# Patient Record
Sex: MALE | Race: WHITE | NOT HISPANIC OR LATINO | URBAN - METROPOLITAN AREA
[De-identification: names, ages, dates, MRNs, and addresses within clinical notes are randomized per-mention and may not be internally consistent; named-entity substitution may affect disease eponyms.]

---

## 2021-01-12 ENCOUNTER — OFFICE (OUTPATIENT)
Dept: URBAN - METROPOLITAN AREA CLINIC 45 | Facility: CLINIC | Age: 81
End: 2021-01-12

## 2021-01-12 VITALS — HEIGHT: 69 IN | WEIGHT: 185 LBS

## 2021-01-12 DIAGNOSIS — D63.8 ANEMIA, CHRONIC ILLNESS: ICD-10-CM

## 2021-01-12 DIAGNOSIS — F03.90 DEMENTIA: ICD-10-CM

## 2021-01-12 DIAGNOSIS — I25.10 CORONARY ARTERY DISEASE: ICD-10-CM

## 2021-01-12 DIAGNOSIS — R10.30: ICD-10-CM

## 2021-01-12 DIAGNOSIS — I63.9: ICD-10-CM

## 2021-01-12 DIAGNOSIS — D50.9 IRON DEFICIENCY ANEMIA: ICD-10-CM

## 2021-01-12 DIAGNOSIS — I48.91 ATRIAL FIBRILLATION: ICD-10-CM

## 2021-01-12 PROCEDURE — G0406 INPT/TELE FOLLOW UP 15: HCPCS | Performed by: INTERNAL MEDICINE

## 2021-01-12 PROCEDURE — 99214 OFFICE O/P EST MOD 30 MIN: CPT | Performed by: INTERNAL MEDICINE

## 2021-01-12 RX ORDER — CAYENNE 450 MG
CAPSULE ORAL
Qty: 60 | Status: COMPLETED
End: 2021-01-12

## 2021-01-12 NOTE — SERVICEHPINOTES
Patient presents for telemedicine evaluation of recent complains of abdominal discomfort/cramping or pain.  Unfortunately, patient cannot provide any history since he is nonverbal following prior CVA. His wife provides most of the history, and reports him complaining of abdominal pain and discomfort, intermittent constipation.  She denies noticing nausea, vomiting, fever or chills There has been a previous EGD performed by me in May 2019 to evaluate anemia while he was hospitalized.  The exam did not demonstrate significant abnormalities or suggest specific stigmata of bleeding. Colonoscopy was deferred given patient's poor functional status.  Patient has been maintained on oral iron supplementation. The patient has no family history of colon cancer or other significant GI diseases. Patient's wife states patient does not have rectal bleeding, melena, and significant change in weight. Denies shortness of breath and chest pain.

## 2021-02-16 ENCOUNTER — OFFICE (OUTPATIENT)
Dept: URBAN - METROPOLITAN AREA CLINIC 45 | Facility: CLINIC | Age: 81
End: 2021-02-16

## 2021-02-16 VITALS — HEIGHT: 69 IN

## 2021-02-16 DIAGNOSIS — R10.30: ICD-10-CM

## 2021-02-16 DIAGNOSIS — I63.9: ICD-10-CM

## 2021-02-16 DIAGNOSIS — I48.91 ATRIAL FIBRILLATION: ICD-10-CM

## 2021-02-16 DIAGNOSIS — D63.8 ANEMIA, CHRONIC ILLNESS: ICD-10-CM

## 2021-02-16 PROCEDURE — G0406 INPT/TELE FOLLOW UP 15: HCPCS | Performed by: INTERNAL MEDICINE

## 2021-02-16 PROCEDURE — 99214 OFFICE O/P EST MOD 30 MIN: CPT | Performed by: INTERNAL MEDICINE

## 2021-02-16 RX ORDER — DICYCLOMINE HYDROCHLORIDE 20 MG/1
TABLET ORAL
Qty: 60 | Status: ACTIVE

## 2021-02-16 NOTE — SERVICEHPINOTES
Patient presents for telemedicine f/u of abdominal discomfort/cramping or pain. Patient's wife provides interpretation since he is practically nonverbal and cannot provide any history. She states he continues to complain of abdominal pain and discomfort,, which seems tolerable. She denies noticing diarrhea/constipation, vomiting or signs of GI blood loss, or significant change in weight. Denies shortness of breath and chest pain.

## 2021-10-18 ENCOUNTER — OFFICE (OUTPATIENT)
Dept: URBAN - METROPOLITAN AREA CLINIC 45 | Facility: CLINIC | Age: 81
End: 2021-10-18

## 2021-10-18 VITALS — HEIGHT: 69 IN

## 2021-10-18 DIAGNOSIS — I48.91 ATRIAL FIBRILLATION: ICD-10-CM

## 2021-10-18 DIAGNOSIS — B18.1: ICD-10-CM

## 2021-10-18 DIAGNOSIS — F03.90 DEMENTIA: ICD-10-CM

## 2021-10-18 DIAGNOSIS — I63.9: ICD-10-CM

## 2021-10-18 DIAGNOSIS — I25.10 CORONARY ARTERY DISEASE: ICD-10-CM

## 2021-10-18 PROCEDURE — G0406 INPT/TELE FOLLOW UP 15: HCPCS | Performed by: INTERNAL MEDICINE

## 2021-10-18 PROCEDURE — 99213 OFFICE O/P EST LOW 20 MIN: CPT | Performed by: INTERNAL MEDICINE

## 2021-10-18 NOTE — SERVICEHPINOTES
Patient returns for telemedicine visit to discuss recent discovery Hepatitis B surface antigen on routine blood work. Patient does not have known history of hepatitis or jaundice. Patient's wife provides interpretation since he is practically nonverbal after CVA and has underlying dementia. She states he is fairly asymptomatic otherwise from GI standpoint. She denies noticing diarrhea/constipation, vomiting or signs of GI blood loss, or significant change in weight. Denies shortness of breath and chest pain.

## 2021-10-28 LAB
HEPATIC FUNCTION PANEL: ALBUMIN/GLOBULIN RATIO: 1.2 (CALC) (ref 1–2.5)
HEPATIC FUNCTION PANEL: ALBUMIN: 4 G/DL (ref 3.6–5.1)
HEPATIC FUNCTION PANEL: ALKALINE PHOSPHATASE: 82 U/L (ref 35–144)
HEPATIC FUNCTION PANEL: ALT: 11 U/L (ref 9–46)
HEPATIC FUNCTION PANEL: AST: 17 U/L (ref 10–35)
HEPATIC FUNCTION PANEL: BILIRUBIN, DIRECT: 0.3 MG/DL — HIGH (ref ?–0.2)
HEPATIC FUNCTION PANEL: BILIRUBIN, INDIRECT: 0.8 MG/DL (CALC) (ref 0.2–1.2)
HEPATIC FUNCTION PANEL: BILIRUBIN, TOTAL: 1.1 MG/DL (ref 0.2–1.2)
HEPATIC FUNCTION PANEL: GLOBULIN: 3.3 G/DL (CALC) (ref 1.9–3.7)
HEPATIC FUNCTION PANEL: PROTEIN, TOTAL: 7.3 G/DL (ref 6.1–8.1)
HEPATITIS B SURFACE ANTIBODY QL: NORMAL
HEPATITIS B SURFACE ANTIGEN W/REFL CONFIRM: CONFIRMATION: REACTIVE
HEPATITIS B SURFACE ANTIGEN W/REFL CONFIRM: HEPATITIS B SURFACE ANTIGEN: REACTIVE
HEPATITIS B VIRUS DNA, QN, REAL TIME PCR: HEPATITIS B VIRUS DNA: ABNORMAL IU/ML
HEPATITIS B VIRUS DNA, QN, REAL TIME PCR: HEPATITIS B VIRUS DNA: ABNORMAL LOG IU/ML
HEPATITIS BE ANTIBODY: REACTIVE

## 2022-03-02 ENCOUNTER — OFFICE (OUTPATIENT)
Dept: URBAN - METROPOLITAN AREA CLINIC 45 | Facility: CLINIC | Age: 82
End: 2022-03-02

## 2022-03-02 VITALS — HEIGHT: 69 IN | SYSTOLIC BLOOD PRESSURE: 95 MMHG | DIASTOLIC BLOOD PRESSURE: 63 MMHG

## 2022-03-02 DIAGNOSIS — F06.8: ICD-10-CM

## 2022-03-02 DIAGNOSIS — D63.8 ANEMIA, CHRONIC ILLNESS: ICD-10-CM

## 2022-03-02 DIAGNOSIS — I63.9: ICD-10-CM

## 2022-03-02 DIAGNOSIS — R19.7 DIARRHEA (UNSPECIFIED): ICD-10-CM

## 2022-03-02 DIAGNOSIS — I25.10 CORONARY ARTERY DISEASE: ICD-10-CM

## 2022-03-02 PROCEDURE — G0406 INPT/TELE FOLLOW UP 15: HCPCS | Performed by: INTERNAL MEDICINE

## 2022-03-02 PROCEDURE — 99214 OFFICE O/P EST MOD 30 MIN: CPT | Performed by: INTERNAL MEDICINE

## 2022-03-02 NOTE — SERVICEHPINOTES
Patient returns for telemedicine evaluation of acute onset of diarrhea with fecal incontinence and generalized weakness.  History was taken from patient's wife since patient has been aphasic following cerebrovascular accident happened several years ago. Patient has not had anymore diarrhea during the day, denies abdominal pain or nausea.  He appears to have decreased appetite, and has been refusing some of his medications. There has been a previous unremarkable EGD performed by me in May 2019 to evaluate anemia while he was hospitalized. Colonoscopy was deferred given patient's poor functional status. Patient's wife states patient does not have rectal bleeding, melena, and significant change in weight. Denies shortness of breath and chest pain.

## 2022-05-16 ENCOUNTER — HOSPITAL ENCOUNTER (INPATIENT)
Dept: HOSPITAL 12 - ER | Age: 82
LOS: 7 days | Discharge: HOME HEALTH | DRG: 280 | End: 2022-05-23
Attending: INTERNAL MEDICINE
Payer: MEDICARE

## 2022-05-16 VITALS — BODY MASS INDEX: 23.59 KG/M2 | HEIGHT: 73 IN | WEIGHT: 178 LBS

## 2022-05-16 DIAGNOSIS — G93.40: ICD-10-CM

## 2022-05-16 DIAGNOSIS — E05.90: ICD-10-CM

## 2022-05-16 DIAGNOSIS — I35.0: ICD-10-CM

## 2022-05-16 DIAGNOSIS — I48.20: ICD-10-CM

## 2022-05-16 DIAGNOSIS — Z85.028: ICD-10-CM

## 2022-05-16 DIAGNOSIS — I69.320: ICD-10-CM

## 2022-05-16 DIAGNOSIS — Z90.3: ICD-10-CM

## 2022-05-16 DIAGNOSIS — T46.5X5A: ICD-10-CM

## 2022-05-16 DIAGNOSIS — R32: ICD-10-CM

## 2022-05-16 DIAGNOSIS — E87.5: ICD-10-CM

## 2022-05-16 DIAGNOSIS — L03.311: ICD-10-CM

## 2022-05-16 DIAGNOSIS — G40.909: ICD-10-CM

## 2022-05-16 DIAGNOSIS — Z74.09: ICD-10-CM

## 2022-05-16 DIAGNOSIS — K80.20: ICD-10-CM

## 2022-05-16 DIAGNOSIS — Z87.891: ICD-10-CM

## 2022-05-16 DIAGNOSIS — I95.2: Primary | ICD-10-CM

## 2022-05-16 DIAGNOSIS — Z90.49: ICD-10-CM

## 2022-05-16 DIAGNOSIS — I69.351: ICD-10-CM

## 2022-05-16 DIAGNOSIS — N20.0: ICD-10-CM
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
## 2022-05-16 DIAGNOSIS — T50.2X5A: ICD-10-CM

## 2022-05-16 DIAGNOSIS — Z93.2: ICD-10-CM

## 2022-05-16 DIAGNOSIS — Z79.01: ICD-10-CM

## 2022-05-16 DIAGNOSIS — Z20.822: ICD-10-CM

## 2022-05-16 DIAGNOSIS — F01.50: ICD-10-CM

## 2022-05-16 DIAGNOSIS — Z66: ICD-10-CM

## 2022-05-16 DIAGNOSIS — N17.0: ICD-10-CM

## 2022-05-16 DIAGNOSIS — I49.5: ICD-10-CM

## 2022-05-16 DIAGNOSIS — I21.A1: ICD-10-CM

## 2022-05-16 DIAGNOSIS — R73.9: ICD-10-CM

## 2022-05-16 DIAGNOSIS — D68.59: ICD-10-CM

## 2022-05-16 DIAGNOSIS — Z85.038: ICD-10-CM

## 2022-05-16 DIAGNOSIS — I13.0: ICD-10-CM

## 2022-05-16 DIAGNOSIS — N18.9: ICD-10-CM

## 2022-05-16 DIAGNOSIS — K57.30: ICD-10-CM

## 2022-05-16 DIAGNOSIS — I77.810: ICD-10-CM

## 2022-05-16 DIAGNOSIS — Z87.01: ICD-10-CM

## 2022-05-16 DIAGNOSIS — I50.31: ICD-10-CM

## 2022-05-16 LAB
ALP SERPL-CCNC: 218 U/L (ref 50–136)
ALT SERPL W/O P-5'-P-CCNC: 37 U/L (ref 16–63)
APPEARANCE UR: CLEAR
AST SERPL-CCNC: 15 U/L (ref 15–37)
BILIRUB DIRECT SERPL-MCNC: 0.3 MG/DL (ref 0–0.2)
BILIRUB SERPL-MCNC: 0.7 MG/DL (ref 0.2–1)
BILIRUB UR QL STRIP: (no result)
BUN SERPL-MCNC: 70 MG/DL (ref 7–18)
CHLORIDE SERPL-SCNC: 108 MMOL/L (ref 98–107)
CO2 SERPL-SCNC: 14 MMOL/L (ref 21–32)
COLOR UR: YELLOW
CREAT SERPL-MCNC: 8.1 MG/DL (ref 0.6–1.3)
DEPRECATED SQUAMOUS URNS QL MICRO: (no result) /HPF
GLUCOSE SERPL-MCNC: 110 MG/DL (ref 74–106)
GLUCOSE UR STRIP-MCNC: NEGATIVE MG/DL
HCT VFR BLD AUTO: 38.1 % (ref 36.7–47.1)
HGB UR QL STRIP: (no result)
KETONES UR STRIP-MCNC: (no result) MG/DL
LEUKOCYTE ESTERASE UR QL STRIP: NEGATIVE
LIPASE SERPL-CCNC: 511 U/L (ref 73–393)
MCH RBC QN AUTO: 29.5 UUG (ref 23.8–33.4)
MCV RBC AUTO: 90.5 FL (ref 73–96.2)
NITRITE UR QL STRIP: NEGATIVE
PH UR STRIP: 5 [PH] (ref 5–8)
PLATELET # BLD AUTO: 153 K/UL (ref 152–348)
POTASSIUM SERPL-SCNC: 5.3 MMOL/L (ref 3.5–5.1)
RBC #/AREA URNS HPF: (no result) /HPF (ref 0–3)
SP GR UR STRIP: >=1.03 (ref 1–1.03)
UROBILINOGEN UR STRIP-MCNC: 0.2 E.U./DL
WBC #/AREA URNS HPF: (no result) /HPF
WBC #/AREA URNS HPF: (no result) /HPF (ref 0–3)
WS STN SPEC: 6.9 G/DL (ref 6.4–8.2)

## 2022-05-16 PROCEDURE — A6209 FOAM DRSG <=16 SQ IN W/O BDR: HCPCS

## 2022-05-16 PROCEDURE — G0378 HOSPITAL OBSERVATION PER HR: HCPCS

## 2022-05-16 PROCEDURE — C1758 CATHETER, URETERAL: HCPCS

## 2022-05-16 PROCEDURE — A4663 DIALYSIS BLOOD PRESSURE CUFF: HCPCS

## 2022-05-16 NOTE — NUR
pt's family state they are leaving phone number for contact is [place] [phone] they state they do not want a colostomy bag placed as there is a rash at 
stoma site and do not want a urinary f/c placed.

## 2022-05-17 VITALS — SYSTOLIC BLOOD PRESSURE: 108 MMHG | DIASTOLIC BLOOD PRESSURE: 38 MMHG

## 2022-05-17 VITALS — SYSTOLIC BLOOD PRESSURE: 103 MMHG | DIASTOLIC BLOOD PRESSURE: 53 MMHG

## 2022-05-17 VITALS — SYSTOLIC BLOOD PRESSURE: 96 MMHG | DIASTOLIC BLOOD PRESSURE: 57 MMHG

## 2022-05-17 VITALS — DIASTOLIC BLOOD PRESSURE: 54 MMHG | SYSTOLIC BLOOD PRESSURE: 92 MMHG

## 2022-05-17 VITALS — SYSTOLIC BLOOD PRESSURE: 101 MMHG | DIASTOLIC BLOOD PRESSURE: 71 MMHG

## 2022-05-17 VITALS — DIASTOLIC BLOOD PRESSURE: 94 MMHG | SYSTOLIC BLOOD PRESSURE: 106 MMHG

## 2022-05-17 LAB
APPEARANCE UR: CLEAR
BILIRUB UR QL STRIP: NEGATIVE
BUN SERPL-MCNC: 66 MG/DL (ref 7–18)
BUN SERPL-MCNC: 72 MG/DL (ref 7–18)
CHLORIDE SERPL-SCNC: 111 MMOL/L (ref 98–107)
CHLORIDE SERPL-SCNC: 111 MMOL/L (ref 98–107)
CHOLEST SERPL-MCNC: 85 MG/DL (ref ?–200)
CO2 SERPL-SCNC: 14 MMOL/L (ref 21–32)
CO2 SERPL-SCNC: 16 MMOL/L (ref 21–32)
COLOR UR: YELLOW
CREAT SERPL-MCNC: 6.8 MG/DL (ref 0.6–1.3)
CREAT SERPL-MCNC: 7.3 MG/DL (ref 0.6–1.3)
DEPRECATED SQUAMOUS URNS QL MICRO: (no result) /HPF
GLUCOSE SERPL-MCNC: 142 MG/DL (ref 74–106)
GLUCOSE SERPL-MCNC: 90 MG/DL (ref 74–106)
GLUCOSE UR STRIP-MCNC: NEGATIVE MG/DL
HCT VFR BLD AUTO: 39 % (ref 36.7–47.1)
HDLC SERPL-MCNC: 54 MG/DL (ref 40–60)
HGB UR QL STRIP: (no result)
KETONES UR STRIP-MCNC: NEGATIVE MG/DL
LEUKOCYTE ESTERASE UR QL STRIP: NEGATIVE
MAGNESIUM SERPL-MCNC: 2.4 MG/DL (ref 1.8–2.4)
MCH RBC QN AUTO: 29.7 UUG (ref 23.8–33.4)
MCV RBC AUTO: 89.5 FL (ref 73–96.2)
NITRITE UR QL STRIP: NEGATIVE
PH UR STRIP: 5 [PH] (ref 5–8)
PHOSPHATE SERPL-MCNC: 5.6 MG/DL (ref 2.5–4.9)
PLATELET # BLD AUTO: 148 K/UL (ref 152–348)
POTASSIUM SERPL-SCNC: 4.9 MMOL/L (ref 3.5–5.1)
POTASSIUM SERPL-SCNC: 5.3 MMOL/L (ref 3.5–5.1)
RBC #/AREA URNS HPF: (no result) /HPF (ref 0–3)
SP GR UR STRIP: 1.02 (ref 1–1.03)
TRIGL SERPL-MCNC: 16 MG/DL (ref 30–150)
TSH SERPL DL<=0.005 MIU/L-ACNC: 0.01 MIU/ML (ref 0.36–3.74)
UROBILINOGEN UR STRIP-MCNC: 0.2 E.U./DL
WBC #/AREA URNS HPF: (no result) /HPF
WBC #/AREA URNS HPF: (no result) /HPF (ref 0–3)

## 2022-05-17 RX ADMIN — SODIUM CHLORIDE PRN MLS/HR: 0.45 INJECTION, SOLUTION INTRAVENOUS at 03:07

## 2022-05-17 RX ADMIN — APIXABAN SCH MG: 2.5 TABLET, FILM COATED ORAL at 08:23

## 2022-05-17 RX ADMIN — DEXTROSE AND SODIUM CHLORIDE PRN MLS/HR: 5; .9 INJECTION, SOLUTION INTRAVENOUS at 21:43

## 2022-05-17 RX ADMIN — SODIUM CHLORIDE PRN MLS/HR: 0.45 INJECTION, SOLUTION INTRAVENOUS at 16:12

## 2022-05-17 RX ADMIN — ATORVASTATIN CALCIUM SCH MG: 40 TABLET, FILM COATED ORAL at 20:51

## 2022-05-17 RX ADMIN — SODIUM CHLORIDE PRN MLS/HR: 0.45 INJECTION, SOLUTION INTRAVENOUS at 08:23

## 2022-05-17 RX ADMIN — PANTOPRAZOLE SODIUM SCH MG: 40 TABLET, DELAYED RELEASE ORAL at 06:33

## 2022-05-17 RX ADMIN — APIXABAN SCH MG: 2.5 TABLET, FILM COATED ORAL at 20:52

## 2022-05-17 NOTE — NUR
Received patient in bed, awake but aphasic, patient on tele monitor still smitha on 42 to 46, 
Patient appears comfortable, no sob no chest pain noted, colostomy intact drainining with 
brownish greenish soft feces, incontinent of bladder, kept clean and dry, no s/s of pain at 
this time cont to monitor.

## 2022-05-17 NOTE — NUR
pt transferred to room 301b Anahy LAST and Yelena LAST in room to receive the pt.  
pt transferred with all belongings.

## 2022-05-17 NOTE — NUR
Pt in no acute distress. Pt iv intact. Pt on controlled afib with BBB. Pt turned and 
repositioned.Safety and comfort provided. Will con tinue to monitor.

## 2022-05-17 NOTE — NUR
PATIENT TELE MONITOR A FIB LOW 32 UP AND DOWN, NOTIFY DR. SANCHEZ ABOUT THE MULTIPLE EPISODE OF 
BRADYCARDIA.

## 2022-05-17 NOTE — NUR
Received patient in [facility] awake and alert to self. Nonverbal with history of CVA. 99% on 
room air. Admitting diagnosis is ELADIA. Under the care of Sadia. Patient is 
incontinent.  History of gastrostomy resection with colostomy. Stoma is red. No colostomy 
bag present as per family request.  skin rash around stoma.  Stool is small amount and 
yellow.  LFA 22 gauge. On tele.

## 2022-05-18 VITALS — DIASTOLIC BLOOD PRESSURE: 55 MMHG | SYSTOLIC BLOOD PRESSURE: 89 MMHG

## 2022-05-18 VITALS — DIASTOLIC BLOOD PRESSURE: 66 MMHG | SYSTOLIC BLOOD PRESSURE: 107 MMHG

## 2022-05-18 VITALS — SYSTOLIC BLOOD PRESSURE: 108 MMHG | DIASTOLIC BLOOD PRESSURE: 66 MMHG

## 2022-05-18 VITALS — DIASTOLIC BLOOD PRESSURE: 56 MMHG | SYSTOLIC BLOOD PRESSURE: 102 MMHG

## 2022-05-18 VITALS — SYSTOLIC BLOOD PRESSURE: 93 MMHG | DIASTOLIC BLOOD PRESSURE: 63 MMHG

## 2022-05-18 LAB
ALP SERPL-CCNC: 176 U/L (ref 50–136)
ALT SERPL W/O P-5'-P-CCNC: 31 U/L (ref 16–63)
AST SERPL-CCNC: 10 U/L (ref 15–37)
BILIRUB SERPL-MCNC: 0.9 MG/DL (ref 0.2–1)
BUN SERPL-MCNC: 59 MG/DL (ref 7–18)
CHLORIDE SERPL-SCNC: 112 MMOL/L (ref 98–107)
CK SERPL-CCNC: 40 U/L (ref 39–308)
CO2 SERPL-SCNC: 18 MMOL/L (ref 21–32)
CREAT SERPL-MCNC: 4.4 MG/DL (ref 0.6–1.3)
CREAT UR-MCNC: 208.6 MG/DL (ref 30–125)
GLUCOSE SERPL-MCNC: 92 MG/DL (ref 74–106)
HCT VFR BLD AUTO: 36.6 % (ref 36.7–47.1)
LIPASE SERPL-CCNC: 564 U/L (ref 73–393)
MAGNESIUM SERPL-MCNC: 2 MG/DL (ref 1.8–2.4)
MCH RBC QN AUTO: 29.6 UUG (ref 23.8–33.4)
MCV RBC AUTO: 88.8 FL (ref 73–96.2)
PHOSPHATE SERPL-MCNC: 4 MG/DL (ref 2.5–4.9)
PLATELET # BLD AUTO: 127 K/UL (ref 152–348)
POTASSIUM SERPL-SCNC: 5.1 MMOL/L (ref 3.5–5.1)
PROT UR-MCNC: 121.9 MG/DL
TSH SERPL DL<=0.005 MIU/L-ACNC: 0.01 MIU/ML (ref 0.36–3.74)
WS STN SPEC: 6.6 G/DL (ref 6.4–8.2)

## 2022-05-18 RX ADMIN — DEXTROSE AND SODIUM CHLORIDE PRN MLS/HR: 5; .9 INJECTION, SOLUTION INTRAVENOUS at 10:12

## 2022-05-18 RX ADMIN — MIDODRINE HYDROCHLORIDE SCH MG: 5 TABLET ORAL at 16:58

## 2022-05-18 RX ADMIN — DEXTROSE AND SODIUM CHLORIDE PRN MLS/HR: 5; .9 INJECTION, SOLUTION INTRAVENOUS at 23:10

## 2022-05-18 RX ADMIN — APIXABAN SCH MG: 2.5 TABLET, FILM COATED ORAL at 09:00

## 2022-05-18 RX ADMIN — APIXABAN SCH MG: 2.5 TABLET, FILM COATED ORAL at 20:40

## 2022-05-18 RX ADMIN — MIDODRINE HYDROCHLORIDE SCH MG: 5 TABLET ORAL at 21:05

## 2022-05-18 RX ADMIN — ATORVASTATIN CALCIUM SCH MG: 40 TABLET, FILM COATED ORAL at 20:40

## 2022-05-18 RX ADMIN — Medication SCH ML: at 12:37

## 2022-05-18 RX ADMIN — PANTOPRAZOLE SODIUM SCH MG: 40 TABLET, DELAYED RELEASE ORAL at 06:18

## 2022-05-18 NOTE — NUR
Patient has episode of v tach 4 sec, checked patient noted with restlessness, 
reposition,changed patient wet diaper,  kept patient clean and dry, kept hob elevated, put 
back her oxygen, patient went back to sinus rhythm sinus smitha , no s/s of distress, cont to 
monitor.

## 2022-05-18 NOTE — NUR
AWAKE ALERT AND VERBALLY RESPONSIVE BUT CONFUSED X3, BARELY FOLLOW COMMANDS. NO SS OF PAIN 
OR SOB WITH O2 AT 3L NC SATURATING 93-95 %. CONTINUE TELE MONITORING CONTROLLED A-FIB ON 
MONITOR

## 2022-05-18 NOTE — NUR
Received patient lying in bed. AAOx1 only, mainly confused. In no apparent distress. On O2 
at 3LPM via NC in place. O2 sat at 92%. No signs or symptoms of pain or SOB. A fib on tele 
with HR of 47/min. Colostomy bag to RLQ intact. 50cc liquid stool drained. Needs assessed 
and attended to. Safety measure initiated and call light within reached.

## 2022-05-19 VITALS — SYSTOLIC BLOOD PRESSURE: 115 MMHG | DIASTOLIC BLOOD PRESSURE: 73 MMHG

## 2022-05-19 VITALS — SYSTOLIC BLOOD PRESSURE: 88 MMHG | DIASTOLIC BLOOD PRESSURE: 48 MMHG

## 2022-05-19 VITALS — DIASTOLIC BLOOD PRESSURE: 53 MMHG | SYSTOLIC BLOOD PRESSURE: 93 MMHG

## 2022-05-19 VITALS — SYSTOLIC BLOOD PRESSURE: 147 MMHG | DIASTOLIC BLOOD PRESSURE: 95 MMHG

## 2022-05-19 VITALS — SYSTOLIC BLOOD PRESSURE: 98 MMHG | DIASTOLIC BLOOD PRESSURE: 52 MMHG

## 2022-05-19 LAB
ALBUMIN SERPL ELPH-MCNC: 2.9 G/DL (ref 2.9–4.4)
ALBUMIN/GLOB SERPL: 0.9 {RATIO} (ref 0.7–1.7)
ALPHA1 GLOB SERPL ELPH-MCNC: 0.2 G/DL (ref 0–0.4)
ALPHA2 GLOB SERPL ELPH-MCNC: 0.6 G/DL (ref 0.4–1)
B-GLOBULIN SERPL ELPH-MCNC: 1 G/DL (ref 0.7–1.3)
BUN SERPL-MCNC: 48 MG/DL (ref 7–18)
CHLORIDE SERPL-SCNC: 114 MMOL/L (ref 98–107)
CO2 SERPL-SCNC: 18 MMOL/L (ref 21–32)
CREAT SERPL-MCNC: 2.5 MG/DL (ref 0.6–1.3)
GAMMA GLOB SERPL ELPH-MCNC: 1.4 G/DL (ref 0.4–1.8)
GLOBULIN SER CALC-MCNC: 3.2 G/DL (ref 2.2–3.9)
GLUCOSE SERPL-MCNC: 89 MG/DL (ref 74–106)
HCT VFR BLD AUTO: 35.8 % (ref 36.7–47.1)
MAGNESIUM SERPL-MCNC: 1.6 MG/DL (ref 1.8–2.4)
MCH RBC QN AUTO: 29.5 UUG (ref 23.8–33.4)
MCV RBC AUTO: 88.9 FL (ref 73–96.2)
PHOSPHATE SERPL-MCNC: 2.6 MG/DL (ref 2.5–4.9)
PLATELET # BLD AUTO: 121 K/UL (ref 152–348)
POTASSIUM SERPL-SCNC: 5.1 MMOL/L (ref 3.5–5.1)

## 2022-05-19 RX ADMIN — Medication PRN MLS/HR: at 12:09

## 2022-05-19 RX ADMIN — ACETAMINOPHEN PRN MG: 325 TABLET ORAL at 17:59

## 2022-05-19 RX ADMIN — APIXABAN SCH MG: 2.5 TABLET, FILM COATED ORAL at 08:08

## 2022-05-19 RX ADMIN — MIDODRINE HYDROCHLORIDE SCH MG: 5 TABLET ORAL at 21:11

## 2022-05-19 RX ADMIN — MIDODRINE HYDROCHLORIDE SCH MG: 5 TABLET ORAL at 12:56

## 2022-05-19 RX ADMIN — MIDODRINE HYDROCHLORIDE SCH MG: 5 TABLET ORAL at 06:12

## 2022-05-19 RX ADMIN — ATORVASTATIN CALCIUM SCH MG: 40 TABLET, FILM COATED ORAL at 20:47

## 2022-05-19 RX ADMIN — DEXTROSE AND SODIUM CHLORIDE PRN MLS/HR: 5; .9 INJECTION, SOLUTION INTRAVENOUS at 10:13

## 2022-05-19 RX ADMIN — PANTOPRAZOLE SODIUM SCH MG: 40 TABLET, DELAYED RELEASE ORAL at 06:12

## 2022-05-19 RX ADMIN — Medication SCH ML: at 08:09

## 2022-05-19 NOTE — NUR
AAOx1 to self only, mainly confused. Garbled sound only. No clear words spoken. On O2 at 
3LPM via NC in place.  No signs or symptoms of pain or SOB. A fib on tele with HR of 48/min. 
IV site on right FA intact and patent. IVF infusing. Colostomy bag to RLQ intact and 
drained. Needs assessed and attended to. Safety measure maintained and call light within 
reached.

## 2022-05-19 NOTE — NUR
HEART RATE SUSTAINING AT 73-83/MON. CONTINUE WITH DOPAMINE DRIP AT 5 MCG/KG/MIN. PER 
CARDIOLOGIST/PHARMACIST OK FOR PATIENT TO STAY AS JACLYN WITH THE DRIP

## 2022-05-19 NOTE — NUR
NOTED PATIENT WITH BOUTS OF CONFUSION AND PHYSICALLY COMBATIVE DURING CARE REORIENTATION 
DONE. REDDENED SKIN AROUND COLOSTOMY DRY AND HEALING . COLOSTOMY INTACT AND FUNCTIONING 
WELL. HEART RATE WITHINNORMAL RANGE/PARAMETER WITH DOPAMINE DRIP

## 2022-05-19 NOTE — NUR
PATIENT IN BED NOTED ON MONITOR WITH LONG PAUSES AT A RATE 31-35/MIN, BP 95/50, RR-19 WARM 
AND DRY SKIN. DR BOSS CAME AND EXAMINED PATIENT, SPOKE WITH DAUGHTER AND WIFE BOTH AGREED 
HIGHER LEVEL OF CARE AND TRANSFER TO ICU/FOR DOPAMINE DRIP AND POSSIBLE PACEMAKER AT [place]. SUPERVISOR  NOTIFIED,  ALSO NOTIFIED

## 2022-05-19 NOTE — NUR
PATIENT RESTING IN BED AWAKE BUT CONFUSED  X3, FOLLOWS ONLY SIMPLE INSTRUCTION. CONTINUE 
WITH TELE STATUS AND MONITOR BP. AFIB ON THE 40'S

## 2022-05-20 VITALS — SYSTOLIC BLOOD PRESSURE: 130 MMHG | DIASTOLIC BLOOD PRESSURE: 99 MMHG

## 2022-05-20 VITALS — DIASTOLIC BLOOD PRESSURE: 61 MMHG | SYSTOLIC BLOOD PRESSURE: 97 MMHG

## 2022-05-20 VITALS — DIASTOLIC BLOOD PRESSURE: 94 MMHG | SYSTOLIC BLOOD PRESSURE: 157 MMHG

## 2022-05-20 VITALS — DIASTOLIC BLOOD PRESSURE: 71 MMHG | SYSTOLIC BLOOD PRESSURE: 116 MMHG

## 2022-05-20 VITALS — DIASTOLIC BLOOD PRESSURE: 83 MMHG | SYSTOLIC BLOOD PRESSURE: 132 MMHG

## 2022-05-20 VITALS — DIASTOLIC BLOOD PRESSURE: 79 MMHG | SYSTOLIC BLOOD PRESSURE: 142 MMHG

## 2022-05-20 VITALS — DIASTOLIC BLOOD PRESSURE: 74 MMHG | SYSTOLIC BLOOD PRESSURE: 130 MMHG

## 2022-05-20 VITALS — DIASTOLIC BLOOD PRESSURE: 100 MMHG | SYSTOLIC BLOOD PRESSURE: 151 MMHG

## 2022-05-20 VITALS — DIASTOLIC BLOOD PRESSURE: 69 MMHG | SYSTOLIC BLOOD PRESSURE: 106 MMHG

## 2022-05-20 VITALS — SYSTOLIC BLOOD PRESSURE: 132 MMHG | DIASTOLIC BLOOD PRESSURE: 71 MMHG

## 2022-05-20 VITALS — SYSTOLIC BLOOD PRESSURE: 148 MMHG | DIASTOLIC BLOOD PRESSURE: 91 MMHG

## 2022-05-20 VITALS — DIASTOLIC BLOOD PRESSURE: 137 MMHG | SYSTOLIC BLOOD PRESSURE: 182 MMHG

## 2022-05-20 VITALS — DIASTOLIC BLOOD PRESSURE: 88 MMHG | SYSTOLIC BLOOD PRESSURE: 141 MMHG

## 2022-05-20 VITALS — DIASTOLIC BLOOD PRESSURE: 94 MMHG | SYSTOLIC BLOOD PRESSURE: 142 MMHG

## 2022-05-20 VITALS — SYSTOLIC BLOOD PRESSURE: 124 MMHG | DIASTOLIC BLOOD PRESSURE: 89 MMHG

## 2022-05-20 VITALS — DIASTOLIC BLOOD PRESSURE: 76 MMHG | SYSTOLIC BLOOD PRESSURE: 147 MMHG

## 2022-05-20 VITALS — SYSTOLIC BLOOD PRESSURE: 129 MMHG | DIASTOLIC BLOOD PRESSURE: 84 MMHG

## 2022-05-20 VITALS — DIASTOLIC BLOOD PRESSURE: 60 MMHG | SYSTOLIC BLOOD PRESSURE: 120 MMHG

## 2022-05-20 VITALS — SYSTOLIC BLOOD PRESSURE: 139 MMHG | DIASTOLIC BLOOD PRESSURE: 83 MMHG

## 2022-05-20 VITALS — SYSTOLIC BLOOD PRESSURE: 115 MMHG | DIASTOLIC BLOOD PRESSURE: 68 MMHG

## 2022-05-20 VITALS — SYSTOLIC BLOOD PRESSURE: 78 MMHG | DIASTOLIC BLOOD PRESSURE: 48 MMHG

## 2022-05-20 VITALS — DIASTOLIC BLOOD PRESSURE: 95 MMHG | SYSTOLIC BLOOD PRESSURE: 134 MMHG

## 2022-05-20 VITALS — DIASTOLIC BLOOD PRESSURE: 72 MMHG | SYSTOLIC BLOOD PRESSURE: 120 MMHG

## 2022-05-20 VITALS — SYSTOLIC BLOOD PRESSURE: 131 MMHG | DIASTOLIC BLOOD PRESSURE: 76 MMHG

## 2022-05-20 VITALS — SYSTOLIC BLOOD PRESSURE: 137 MMHG | DIASTOLIC BLOOD PRESSURE: 50 MMHG

## 2022-05-20 VITALS — SYSTOLIC BLOOD PRESSURE: 119 MMHG | DIASTOLIC BLOOD PRESSURE: 71 MMHG

## 2022-05-20 VITALS — DIASTOLIC BLOOD PRESSURE: 93 MMHG | SYSTOLIC BLOOD PRESSURE: 151 MMHG

## 2022-05-20 VITALS — DIASTOLIC BLOOD PRESSURE: 71 MMHG | SYSTOLIC BLOOD PRESSURE: 114 MMHG

## 2022-05-20 VITALS — DIASTOLIC BLOOD PRESSURE: 72 MMHG | SYSTOLIC BLOOD PRESSURE: 116 MMHG

## 2022-05-20 VITALS — DIASTOLIC BLOOD PRESSURE: 92 MMHG | SYSTOLIC BLOOD PRESSURE: 129 MMHG

## 2022-05-20 VITALS — DIASTOLIC BLOOD PRESSURE: 68 MMHG | SYSTOLIC BLOOD PRESSURE: 98 MMHG

## 2022-05-20 VITALS — DIASTOLIC BLOOD PRESSURE: 87 MMHG | SYSTOLIC BLOOD PRESSURE: 130 MMHG

## 2022-05-20 VITALS — SYSTOLIC BLOOD PRESSURE: 79 MMHG | DIASTOLIC BLOOD PRESSURE: 35 MMHG

## 2022-05-20 VITALS — DIASTOLIC BLOOD PRESSURE: 90 MMHG | SYSTOLIC BLOOD PRESSURE: 130 MMHG

## 2022-05-20 VITALS — DIASTOLIC BLOOD PRESSURE: 78 MMHG | SYSTOLIC BLOOD PRESSURE: 134 MMHG

## 2022-05-20 VITALS — SYSTOLIC BLOOD PRESSURE: 147 MMHG | DIASTOLIC BLOOD PRESSURE: 80 MMHG

## 2022-05-20 VITALS — SYSTOLIC BLOOD PRESSURE: 135 MMHG | DIASTOLIC BLOOD PRESSURE: 99 MMHG

## 2022-05-20 VITALS — DIASTOLIC BLOOD PRESSURE: 90 MMHG | SYSTOLIC BLOOD PRESSURE: 132 MMHG

## 2022-05-20 VITALS — SYSTOLIC BLOOD PRESSURE: 127 MMHG | DIASTOLIC BLOOD PRESSURE: 76 MMHG

## 2022-05-20 VITALS — DIASTOLIC BLOOD PRESSURE: 59 MMHG | SYSTOLIC BLOOD PRESSURE: 105 MMHG

## 2022-05-20 VITALS — DIASTOLIC BLOOD PRESSURE: 71 MMHG | SYSTOLIC BLOOD PRESSURE: 106 MMHG

## 2022-05-20 VITALS — SYSTOLIC BLOOD PRESSURE: 129 MMHG | DIASTOLIC BLOOD PRESSURE: 85 MMHG

## 2022-05-20 VITALS — SYSTOLIC BLOOD PRESSURE: 132 MMHG | DIASTOLIC BLOOD PRESSURE: 27 MMHG

## 2022-05-20 VITALS — SYSTOLIC BLOOD PRESSURE: 129 MMHG | DIASTOLIC BLOOD PRESSURE: 67 MMHG

## 2022-05-20 VITALS — SYSTOLIC BLOOD PRESSURE: 154 MMHG | DIASTOLIC BLOOD PRESSURE: 83 MMHG

## 2022-05-20 VITALS — SYSTOLIC BLOOD PRESSURE: 138 MMHG | DIASTOLIC BLOOD PRESSURE: 83 MMHG

## 2022-05-20 VITALS — DIASTOLIC BLOOD PRESSURE: 74 MMHG | SYSTOLIC BLOOD PRESSURE: 126 MMHG

## 2022-05-20 VITALS — DIASTOLIC BLOOD PRESSURE: 89 MMHG | SYSTOLIC BLOOD PRESSURE: 128 MMHG

## 2022-05-20 VITALS — SYSTOLIC BLOOD PRESSURE: 126 MMHG | DIASTOLIC BLOOD PRESSURE: 75 MMHG

## 2022-05-20 VITALS — DIASTOLIC BLOOD PRESSURE: 87 MMHG | SYSTOLIC BLOOD PRESSURE: 140 MMHG

## 2022-05-20 VITALS — DIASTOLIC BLOOD PRESSURE: 82 MMHG | SYSTOLIC BLOOD PRESSURE: 134 MMHG

## 2022-05-20 VITALS — DIASTOLIC BLOOD PRESSURE: 82 MMHG | SYSTOLIC BLOOD PRESSURE: 143 MMHG

## 2022-05-20 VITALS — SYSTOLIC BLOOD PRESSURE: 130 MMHG | DIASTOLIC BLOOD PRESSURE: 72 MMHG

## 2022-05-20 VITALS — DIASTOLIC BLOOD PRESSURE: 81 MMHG | SYSTOLIC BLOOD PRESSURE: 144 MMHG

## 2022-05-20 VITALS — SYSTOLIC BLOOD PRESSURE: 151 MMHG | DIASTOLIC BLOOD PRESSURE: 93 MMHG

## 2022-05-20 VITALS — SYSTOLIC BLOOD PRESSURE: 60 MMHG | DIASTOLIC BLOOD PRESSURE: 33 MMHG

## 2022-05-20 VITALS — SYSTOLIC BLOOD PRESSURE: 142 MMHG | DIASTOLIC BLOOD PRESSURE: 82 MMHG

## 2022-05-20 LAB
BUN SERPL-MCNC: 35 MG/DL (ref 7–18)
CHLORIDE SERPL-SCNC: 116 MMOL/L (ref 98–107)
CO2 SERPL-SCNC: 13 MMOL/L (ref 21–32)
CREAT SERPL-MCNC: 1.7 MG/DL (ref 0.6–1.3)
GLUCOSE SERPL-MCNC: 122 MG/DL (ref 74–106)
MAGNESIUM SERPL-MCNC: 1.8 MG/DL (ref 1.8–2.4)
POTASSIUM SERPL-SCNC: 5 MMOL/L (ref 3.5–5.1)

## 2022-05-20 PROCEDURE — 05HD33Z INSERTION OF INFUSION DEVICE INTO RIGHT CEPHALIC VEIN, PERCUTANEOUS APPROACH: ICD-10-PCS

## 2022-05-20 RX ADMIN — Medication PRN MLS/HR: at 01:56

## 2022-05-20 RX ADMIN — Medication SCH ML: at 09:01

## 2022-05-20 RX ADMIN — ATORVASTATIN CALCIUM SCH MG: 40 TABLET, FILM COATED ORAL at 21:11

## 2022-05-20 RX ADMIN — MIDODRINE HYDROCHLORIDE SCH MG: 5 TABLET ORAL at 14:53

## 2022-05-20 RX ADMIN — MIDODRINE HYDROCHLORIDE SCH MG: 5 TABLET ORAL at 21:12

## 2022-05-20 RX ADMIN — MIDODRINE HYDROCHLORIDE SCH MG: 5 TABLET ORAL at 05:11

## 2022-05-20 RX ADMIN — DEXTROSE AND SODIUM CHLORIDE PRN MLS/HR: 5; .9 INJECTION, SOLUTION INTRAVENOUS at 13:25

## 2022-05-20 RX ADMIN — DEXTROSE AND SODIUM CHLORIDE PRN MLS/HR: 5; .9 INJECTION, SOLUTION INTRAVENOUS at 00:26

## 2022-05-20 RX ADMIN — PANTOPRAZOLE SODIUM SCH MG: 40 TABLET, DELAYED RELEASE ORAL at 05:55

## 2022-05-20 RX ADMIN — Medication PRN MLS/HR: at 17:10

## 2022-05-20 NOTE — NUR
Dr Pavon at bedside assessing patient. New order to titrate off dopamine and to keep heart 
rate above 50.

## 2022-05-20 NOTE — NUR
Received report. Patient is awake, aphasic, VSS, NAD. Controlled Afib on the monitor. On NC 
@1LPM, O2 sat 100%. On mitten restraints. IV PREET ML patent and flushed. LFA 20G noted to be 
infiltrated, patient's left arm swollen, IV line removed, elevated arm and placed on ice 
pack. Photo taken and placed on chart. Inserted new IV RFA 22G patent and flushed with 
ongoing D5NS @80mls, Dopamine 5mcg/kg/min. Colostomy bag intact, emptied stool, stoma 
appeared pinkish. Bed at lowest position, brakes on, siderails x2. Call light within reach. 
Will continue to monitor closely. 

-------------------------------------------------------------------------------

Addendum: 05/21/22 at 0756 by Sasha Lincoln RN

-------------------------------------------------------------------------------

D5NS @80mls/hr.

## 2022-05-21 VITALS — SYSTOLIC BLOOD PRESSURE: 99 MMHG | DIASTOLIC BLOOD PRESSURE: 50 MMHG

## 2022-05-21 VITALS — SYSTOLIC BLOOD PRESSURE: 134 MMHG | DIASTOLIC BLOOD PRESSURE: 103 MMHG

## 2022-05-21 VITALS — DIASTOLIC BLOOD PRESSURE: 87 MMHG | SYSTOLIC BLOOD PRESSURE: 132 MMHG

## 2022-05-21 VITALS — DIASTOLIC BLOOD PRESSURE: 49 MMHG | SYSTOLIC BLOOD PRESSURE: 102 MMHG

## 2022-05-21 VITALS — SYSTOLIC BLOOD PRESSURE: 106 MMHG | DIASTOLIC BLOOD PRESSURE: 37 MMHG

## 2022-05-21 VITALS — SYSTOLIC BLOOD PRESSURE: 123 MMHG | DIASTOLIC BLOOD PRESSURE: 88 MMHG

## 2022-05-21 VITALS — SYSTOLIC BLOOD PRESSURE: 100 MMHG | DIASTOLIC BLOOD PRESSURE: 27 MMHG

## 2022-05-21 VITALS — SYSTOLIC BLOOD PRESSURE: 98 MMHG | DIASTOLIC BLOOD PRESSURE: 39 MMHG

## 2022-05-21 VITALS — DIASTOLIC BLOOD PRESSURE: 41 MMHG | SYSTOLIC BLOOD PRESSURE: 112 MMHG

## 2022-05-21 VITALS — SYSTOLIC BLOOD PRESSURE: 101 MMHG | DIASTOLIC BLOOD PRESSURE: 36 MMHG

## 2022-05-21 VITALS — DIASTOLIC BLOOD PRESSURE: 63 MMHG | SYSTOLIC BLOOD PRESSURE: 83 MMHG

## 2022-05-21 VITALS — SYSTOLIC BLOOD PRESSURE: 91 MMHG | DIASTOLIC BLOOD PRESSURE: 38 MMHG

## 2022-05-21 VITALS — SYSTOLIC BLOOD PRESSURE: 98 MMHG | DIASTOLIC BLOOD PRESSURE: 88 MMHG

## 2022-05-21 VITALS — DIASTOLIC BLOOD PRESSURE: 64 MMHG | SYSTOLIC BLOOD PRESSURE: 95 MMHG

## 2022-05-21 VITALS — DIASTOLIC BLOOD PRESSURE: 73 MMHG | SYSTOLIC BLOOD PRESSURE: 124 MMHG

## 2022-05-21 VITALS — DIASTOLIC BLOOD PRESSURE: 88 MMHG | SYSTOLIC BLOOD PRESSURE: 100 MMHG

## 2022-05-21 VITALS — DIASTOLIC BLOOD PRESSURE: 48 MMHG | SYSTOLIC BLOOD PRESSURE: 109 MMHG

## 2022-05-21 VITALS — DIASTOLIC BLOOD PRESSURE: 88 MMHG | SYSTOLIC BLOOD PRESSURE: 117 MMHG

## 2022-05-21 VITALS — DIASTOLIC BLOOD PRESSURE: 53 MMHG | SYSTOLIC BLOOD PRESSURE: 70 MMHG

## 2022-05-21 VITALS — SYSTOLIC BLOOD PRESSURE: 101 MMHG | DIASTOLIC BLOOD PRESSURE: 51 MMHG

## 2022-05-21 VITALS — DIASTOLIC BLOOD PRESSURE: 33 MMHG | SYSTOLIC BLOOD PRESSURE: 95 MMHG

## 2022-05-21 VITALS — DIASTOLIC BLOOD PRESSURE: 62 MMHG | SYSTOLIC BLOOD PRESSURE: 99 MMHG

## 2022-05-21 VITALS — DIASTOLIC BLOOD PRESSURE: 62 MMHG | SYSTOLIC BLOOD PRESSURE: 103 MMHG

## 2022-05-21 VITALS — DIASTOLIC BLOOD PRESSURE: 49 MMHG | SYSTOLIC BLOOD PRESSURE: 109 MMHG

## 2022-05-21 VITALS — DIASTOLIC BLOOD PRESSURE: 47 MMHG | SYSTOLIC BLOOD PRESSURE: 110 MMHG

## 2022-05-21 VITALS — DIASTOLIC BLOOD PRESSURE: 70 MMHG | SYSTOLIC BLOOD PRESSURE: 121 MMHG

## 2022-05-21 VITALS — SYSTOLIC BLOOD PRESSURE: 57 MMHG | DIASTOLIC BLOOD PRESSURE: 36 MMHG

## 2022-05-21 VITALS — SYSTOLIC BLOOD PRESSURE: 90 MMHG | DIASTOLIC BLOOD PRESSURE: 50 MMHG

## 2022-05-21 VITALS — SYSTOLIC BLOOD PRESSURE: 69 MMHG | DIASTOLIC BLOOD PRESSURE: 37 MMHG

## 2022-05-21 VITALS — DIASTOLIC BLOOD PRESSURE: 28 MMHG | SYSTOLIC BLOOD PRESSURE: 66 MMHG

## 2022-05-21 VITALS — DIASTOLIC BLOOD PRESSURE: 22 MMHG | SYSTOLIC BLOOD PRESSURE: 94 MMHG

## 2022-05-21 VITALS — SYSTOLIC BLOOD PRESSURE: 86 MMHG | DIASTOLIC BLOOD PRESSURE: 45 MMHG

## 2022-05-21 VITALS — SYSTOLIC BLOOD PRESSURE: 115 MMHG | DIASTOLIC BLOOD PRESSURE: 51 MMHG

## 2022-05-21 VITALS — DIASTOLIC BLOOD PRESSURE: 71 MMHG | SYSTOLIC BLOOD PRESSURE: 96 MMHG

## 2022-05-21 VITALS — DIASTOLIC BLOOD PRESSURE: 46 MMHG | SYSTOLIC BLOOD PRESSURE: 75 MMHG

## 2022-05-21 VITALS — SYSTOLIC BLOOD PRESSURE: 83 MMHG | DIASTOLIC BLOOD PRESSURE: 51 MMHG

## 2022-05-21 VITALS — SYSTOLIC BLOOD PRESSURE: 116 MMHG | DIASTOLIC BLOOD PRESSURE: 80 MMHG

## 2022-05-21 VITALS — DIASTOLIC BLOOD PRESSURE: 55 MMHG | SYSTOLIC BLOOD PRESSURE: 109 MMHG

## 2022-05-21 VITALS — SYSTOLIC BLOOD PRESSURE: 72 MMHG | DIASTOLIC BLOOD PRESSURE: 42 MMHG

## 2022-05-21 VITALS — DIASTOLIC BLOOD PRESSURE: 66 MMHG | SYSTOLIC BLOOD PRESSURE: 92 MMHG

## 2022-05-21 VITALS — DIASTOLIC BLOOD PRESSURE: 68 MMHG | SYSTOLIC BLOOD PRESSURE: 118 MMHG

## 2022-05-21 VITALS — DIASTOLIC BLOOD PRESSURE: 37 MMHG | SYSTOLIC BLOOD PRESSURE: 113 MMHG

## 2022-05-21 VITALS — SYSTOLIC BLOOD PRESSURE: 110 MMHG | DIASTOLIC BLOOD PRESSURE: 51 MMHG

## 2022-05-21 VITALS — DIASTOLIC BLOOD PRESSURE: 68 MMHG | SYSTOLIC BLOOD PRESSURE: 138 MMHG

## 2022-05-21 VITALS — DIASTOLIC BLOOD PRESSURE: 42 MMHG | SYSTOLIC BLOOD PRESSURE: 104 MMHG

## 2022-05-21 VITALS — SYSTOLIC BLOOD PRESSURE: 88 MMHG | DIASTOLIC BLOOD PRESSURE: 52 MMHG

## 2022-05-21 VITALS — DIASTOLIC BLOOD PRESSURE: 82 MMHG | SYSTOLIC BLOOD PRESSURE: 109 MMHG

## 2022-05-21 VITALS — DIASTOLIC BLOOD PRESSURE: 32 MMHG | SYSTOLIC BLOOD PRESSURE: 107 MMHG

## 2022-05-21 VITALS — DIASTOLIC BLOOD PRESSURE: 89 MMHG | SYSTOLIC BLOOD PRESSURE: 100 MMHG

## 2022-05-21 VITALS — SYSTOLIC BLOOD PRESSURE: 88 MMHG | DIASTOLIC BLOOD PRESSURE: 72 MMHG

## 2022-05-21 VITALS — DIASTOLIC BLOOD PRESSURE: 45 MMHG | SYSTOLIC BLOOD PRESSURE: 114 MMHG

## 2022-05-21 VITALS — DIASTOLIC BLOOD PRESSURE: 44 MMHG | SYSTOLIC BLOOD PRESSURE: 97 MMHG

## 2022-05-21 VITALS — SYSTOLIC BLOOD PRESSURE: 97 MMHG | DIASTOLIC BLOOD PRESSURE: 40 MMHG

## 2022-05-21 VITALS — SYSTOLIC BLOOD PRESSURE: 114 MMHG | DIASTOLIC BLOOD PRESSURE: 69 MMHG

## 2022-05-21 VITALS — SYSTOLIC BLOOD PRESSURE: 123 MMHG | DIASTOLIC BLOOD PRESSURE: 66 MMHG

## 2022-05-21 VITALS — DIASTOLIC BLOOD PRESSURE: 64 MMHG | SYSTOLIC BLOOD PRESSURE: 119 MMHG

## 2022-05-21 VITALS — DIASTOLIC BLOOD PRESSURE: 47 MMHG | SYSTOLIC BLOOD PRESSURE: 103 MMHG

## 2022-05-21 VITALS — SYSTOLIC BLOOD PRESSURE: 119 MMHG | DIASTOLIC BLOOD PRESSURE: 37 MMHG

## 2022-05-21 VITALS — SYSTOLIC BLOOD PRESSURE: 118 MMHG | DIASTOLIC BLOOD PRESSURE: 44 MMHG

## 2022-05-21 VITALS — DIASTOLIC BLOOD PRESSURE: 57 MMHG | SYSTOLIC BLOOD PRESSURE: 120 MMHG

## 2022-05-21 VITALS — SYSTOLIC BLOOD PRESSURE: 63 MMHG | DIASTOLIC BLOOD PRESSURE: 29 MMHG

## 2022-05-21 VITALS — SYSTOLIC BLOOD PRESSURE: 106 MMHG | DIASTOLIC BLOOD PRESSURE: 62 MMHG

## 2022-05-21 VITALS — DIASTOLIC BLOOD PRESSURE: 53 MMHG | SYSTOLIC BLOOD PRESSURE: 82 MMHG

## 2022-05-21 VITALS — DIASTOLIC BLOOD PRESSURE: 74 MMHG | SYSTOLIC BLOOD PRESSURE: 110 MMHG

## 2022-05-21 LAB
ALP SERPL-CCNC: 138 U/L (ref 50–136)
ALT SERPL W/O P-5'-P-CCNC: 23 U/L (ref 16–63)
AST SERPL-CCNC: 11 U/L (ref 15–37)
BILIRUB SERPL-MCNC: 0.9 MG/DL (ref 0.2–1)
BUN SERPL-MCNC: 24 MG/DL (ref 7–18)
CHLORIDE SERPL-SCNC: 118 MMOL/L (ref 98–107)
CO2 SERPL-SCNC: 17 MMOL/L (ref 21–32)
CREAT SERPL-MCNC: 1.2 MG/DL (ref 0.6–1.3)
GLUCOSE SERPL-MCNC: 94 MG/DL (ref 74–106)
HCT VFR BLD AUTO: 34.3 % (ref 36.7–47.1)
MAGNESIUM SERPL-MCNC: 1.3 MG/DL (ref 1.8–2.4)
MCH RBC QN AUTO: 29.5 UUG (ref 23.8–33.4)
MCV RBC AUTO: 88.6 FL (ref 73–96.2)
PHOSPHATE SERPL-MCNC: 1.3 MG/DL (ref 2.5–4.9)
PLATELET # BLD AUTO: 132 K/UL (ref 152–348)
POTASSIUM SERPL-SCNC: 4.2 MMOL/L (ref 3.5–5.1)
WS STN SPEC: 6.3 G/DL (ref 6.4–8.2)

## 2022-05-21 RX ADMIN — Medication PRN MLS/HR: at 10:00

## 2022-05-21 RX ADMIN — MAGNESIUM SULFATE IN DEXTROSE SCH MLS/HR: 10 INJECTION, SOLUTION INTRAVENOUS at 13:34

## 2022-05-21 RX ADMIN — ACETAMINOPHEN PRN MG: 325 TABLET ORAL at 21:35

## 2022-05-21 RX ADMIN — HYDROCODONE BITARTRATE AND ACETAMINOPHEN PRN TAB: 5; 325 TABLET ORAL at 16:54

## 2022-05-21 RX ADMIN — MAGNESIUM SULFATE IN DEXTROSE SCH MLS/HR: 10 INJECTION, SOLUTION INTRAVENOUS at 10:01

## 2022-05-21 RX ADMIN — Medication SCH ML: at 08:18

## 2022-05-21 RX ADMIN — PANTOPRAZOLE SODIUM SCH MG: 40 TABLET, DELAYED RELEASE ORAL at 06:05

## 2022-05-21 RX ADMIN — DEXTROSE AND SODIUM CHLORIDE PRN MLS/HR: 5; .9 INJECTION, SOLUTION INTRAVENOUS at 17:50

## 2022-05-21 RX ADMIN — MAGNESIUM SULFATE IN DEXTROSE SCH MLS/HR: 10 INJECTION, SOLUTION INTRAVENOUS at 10:57

## 2022-05-21 RX ADMIN — MIDODRINE HYDROCHLORIDE SCH MG: 5 TABLET ORAL at 14:44

## 2022-05-21 RX ADMIN — MIDODRINE HYDROCHLORIDE SCH MG: 5 TABLET ORAL at 05:37

## 2022-05-21 RX ADMIN — MIDODRINE HYDROCHLORIDE SCH MG: 5 TABLET ORAL at 20:41

## 2022-05-21 RX ADMIN — DEXTROSE AND SODIUM CHLORIDE PRN MLS/HR: 5; .9 INJECTION, SOLUTION INTRAVENOUS at 05:02

## 2022-05-21 RX ADMIN — MAGNESIUM SULFATE IN DEXTROSE SCH MLS/HR: 10 INJECTION, SOLUTION INTRAVENOUS at 12:46

## 2022-05-21 NOTE — NUR
seen and visited by  PAM SANCHEZ  updated with patient v/s HR still on the low side ,SB 35 to 
51 with pac and pvcs as per day shift RN patient wife was here came visited patient refusing 
pace- maker insertion and verbalized they want to take patient home,as per PAM SANCHEZ HE WILL 
CALL THE WIFE .

## 2022-05-21 NOTE — NUR
Patient seen by Cardiologist Dr. Frank orders to continue with care plan received and to dcd 
dopamine if possible received.

## 2022-05-21 NOTE — NUR
Received. pt. AAOx1 restless agitated on Mittens with circulation present. On NC 3L with 
saturation above 95%. Cardiac-wise pt. on Atrial fibrilation controlled on dopamine drip 
running at 6mcg/kg/min. On diaper. IV line present and patent. LUE swelling as stated due to 
infiltration of old IV.

## 2022-05-21 NOTE — NUR
incontinent of urine changed soiled linens and gown . z guard applied to bilateral groin 
,scrotum and sacral area .

## 2022-05-21 NOTE — NUR
At this time pt. evaluated by Surgery Dr. Liz for possible pace-maker placement.  
contacted pt's daughter and decision maker, for consent for procedure. and as later stated 
by MD. daughter not agreeable to have procedure done here. No consent obtained.

## 2022-05-21 NOTE — NUR
colostomy bag is full with brownish soft  stool ,changed colostomy bag .patient educated 
with the colostomy bag and not to removed  the bag patient is confused and unable to 
understand .

mittens to bilateral hands in placed for patient  not to  removing equipotents (heart 
monitor  etc.) patient needs more education and supervisions .

## 2022-05-22 VITALS — DIASTOLIC BLOOD PRESSURE: 70 MMHG | SYSTOLIC BLOOD PRESSURE: 110 MMHG

## 2022-05-22 VITALS — DIASTOLIC BLOOD PRESSURE: 59 MMHG | SYSTOLIC BLOOD PRESSURE: 114 MMHG

## 2022-05-22 VITALS — DIASTOLIC BLOOD PRESSURE: 27 MMHG | SYSTOLIC BLOOD PRESSURE: 75 MMHG

## 2022-05-22 VITALS — SYSTOLIC BLOOD PRESSURE: 102 MMHG | DIASTOLIC BLOOD PRESSURE: 57 MMHG

## 2022-05-22 VITALS — DIASTOLIC BLOOD PRESSURE: 75 MMHG | SYSTOLIC BLOOD PRESSURE: 111 MMHG

## 2022-05-22 VITALS — DIASTOLIC BLOOD PRESSURE: 47 MMHG | SYSTOLIC BLOOD PRESSURE: 76 MMHG

## 2022-05-22 VITALS — DIASTOLIC BLOOD PRESSURE: 67 MMHG | SYSTOLIC BLOOD PRESSURE: 125 MMHG

## 2022-05-22 VITALS — DIASTOLIC BLOOD PRESSURE: 54 MMHG | SYSTOLIC BLOOD PRESSURE: 123 MMHG

## 2022-05-22 VITALS — SYSTOLIC BLOOD PRESSURE: 130 MMHG | DIASTOLIC BLOOD PRESSURE: 74 MMHG

## 2022-05-22 VITALS — DIASTOLIC BLOOD PRESSURE: 87 MMHG | SYSTOLIC BLOOD PRESSURE: 166 MMHG

## 2022-05-22 VITALS — SYSTOLIC BLOOD PRESSURE: 122 MMHG | DIASTOLIC BLOOD PRESSURE: 66 MMHG

## 2022-05-22 VITALS — SYSTOLIC BLOOD PRESSURE: 170 MMHG | DIASTOLIC BLOOD PRESSURE: 110 MMHG

## 2022-05-22 VITALS — SYSTOLIC BLOOD PRESSURE: 118 MMHG | DIASTOLIC BLOOD PRESSURE: 38 MMHG

## 2022-05-22 VITALS — SYSTOLIC BLOOD PRESSURE: 103 MMHG | DIASTOLIC BLOOD PRESSURE: 69 MMHG

## 2022-05-22 VITALS — DIASTOLIC BLOOD PRESSURE: 48 MMHG | SYSTOLIC BLOOD PRESSURE: 127 MMHG

## 2022-05-22 VITALS — SYSTOLIC BLOOD PRESSURE: 113 MMHG | DIASTOLIC BLOOD PRESSURE: 60 MMHG

## 2022-05-22 VITALS — SYSTOLIC BLOOD PRESSURE: 143 MMHG | DIASTOLIC BLOOD PRESSURE: 104 MMHG

## 2022-05-22 VITALS — SYSTOLIC BLOOD PRESSURE: 104 MMHG | DIASTOLIC BLOOD PRESSURE: 81 MMHG

## 2022-05-22 VITALS — SYSTOLIC BLOOD PRESSURE: 158 MMHG | DIASTOLIC BLOOD PRESSURE: 115 MMHG

## 2022-05-22 VITALS — SYSTOLIC BLOOD PRESSURE: 109 MMHG | DIASTOLIC BLOOD PRESSURE: 39 MMHG

## 2022-05-22 VITALS — SYSTOLIC BLOOD PRESSURE: 167 MMHG | DIASTOLIC BLOOD PRESSURE: 80 MMHG

## 2022-05-22 VITALS — SYSTOLIC BLOOD PRESSURE: 72 MMHG | DIASTOLIC BLOOD PRESSURE: 43 MMHG

## 2022-05-22 VITALS — DIASTOLIC BLOOD PRESSURE: 56 MMHG | SYSTOLIC BLOOD PRESSURE: 132 MMHG

## 2022-05-22 VITALS — SYSTOLIC BLOOD PRESSURE: 101 MMHG | DIASTOLIC BLOOD PRESSURE: 42 MMHG

## 2022-05-22 VITALS — SYSTOLIC BLOOD PRESSURE: 93 MMHG | DIASTOLIC BLOOD PRESSURE: 55 MMHG

## 2022-05-22 VITALS — DIASTOLIC BLOOD PRESSURE: 42 MMHG | SYSTOLIC BLOOD PRESSURE: 51 MMHG

## 2022-05-22 VITALS — DIASTOLIC BLOOD PRESSURE: 42 MMHG | SYSTOLIC BLOOD PRESSURE: 116 MMHG

## 2022-05-22 VITALS — DIASTOLIC BLOOD PRESSURE: 109 MMHG | SYSTOLIC BLOOD PRESSURE: 156 MMHG

## 2022-05-22 VITALS — SYSTOLIC BLOOD PRESSURE: 97 MMHG | DIASTOLIC BLOOD PRESSURE: 27 MMHG

## 2022-05-22 VITALS — SYSTOLIC BLOOD PRESSURE: 84 MMHG | DIASTOLIC BLOOD PRESSURE: 44 MMHG

## 2022-05-22 VITALS — DIASTOLIC BLOOD PRESSURE: 49 MMHG | SYSTOLIC BLOOD PRESSURE: 101 MMHG

## 2022-05-22 VITALS — SYSTOLIC BLOOD PRESSURE: 85 MMHG | DIASTOLIC BLOOD PRESSURE: 43 MMHG

## 2022-05-22 VITALS — SYSTOLIC BLOOD PRESSURE: 88 MMHG | DIASTOLIC BLOOD PRESSURE: 39 MMHG

## 2022-05-22 VITALS — SYSTOLIC BLOOD PRESSURE: 88 MMHG | DIASTOLIC BLOOD PRESSURE: 52 MMHG

## 2022-05-22 VITALS — DIASTOLIC BLOOD PRESSURE: 24 MMHG | SYSTOLIC BLOOD PRESSURE: 51 MMHG

## 2022-05-22 VITALS — DIASTOLIC BLOOD PRESSURE: 76 MMHG | SYSTOLIC BLOOD PRESSURE: 147 MMHG

## 2022-05-22 VITALS — DIASTOLIC BLOOD PRESSURE: 62 MMHG | SYSTOLIC BLOOD PRESSURE: 119 MMHG

## 2022-05-22 VITALS — SYSTOLIC BLOOD PRESSURE: 116 MMHG | DIASTOLIC BLOOD PRESSURE: 42 MMHG

## 2022-05-22 VITALS — DIASTOLIC BLOOD PRESSURE: 44 MMHG | SYSTOLIC BLOOD PRESSURE: 124 MMHG

## 2022-05-22 VITALS — DIASTOLIC BLOOD PRESSURE: 33 MMHG | SYSTOLIC BLOOD PRESSURE: 76 MMHG

## 2022-05-22 VITALS — DIASTOLIC BLOOD PRESSURE: 42 MMHG | SYSTOLIC BLOOD PRESSURE: 88 MMHG

## 2022-05-22 VITALS — DIASTOLIC BLOOD PRESSURE: 68 MMHG | SYSTOLIC BLOOD PRESSURE: 109 MMHG

## 2022-05-22 VITALS — DIASTOLIC BLOOD PRESSURE: 95 MMHG | SYSTOLIC BLOOD PRESSURE: 159 MMHG

## 2022-05-22 VITALS — SYSTOLIC BLOOD PRESSURE: 119 MMHG | DIASTOLIC BLOOD PRESSURE: 62 MMHG

## 2022-05-22 VITALS — SYSTOLIC BLOOD PRESSURE: 155 MMHG | DIASTOLIC BLOOD PRESSURE: 72 MMHG

## 2022-05-22 VITALS — DIASTOLIC BLOOD PRESSURE: 82 MMHG | SYSTOLIC BLOOD PRESSURE: 114 MMHG

## 2022-05-22 VITALS — DIASTOLIC BLOOD PRESSURE: 95 MMHG | SYSTOLIC BLOOD PRESSURE: 176 MMHG

## 2022-05-22 VITALS — DIASTOLIC BLOOD PRESSURE: 104 MMHG | SYSTOLIC BLOOD PRESSURE: 178 MMHG

## 2022-05-22 VITALS — DIASTOLIC BLOOD PRESSURE: 64 MMHG | SYSTOLIC BLOOD PRESSURE: 110 MMHG

## 2022-05-22 LAB
BUN SERPL-MCNC: 18 MG/DL (ref 7–18)
CHLORIDE SERPL-SCNC: 118 MMOL/L (ref 98–107)
CO2 SERPL-SCNC: 19 MMOL/L (ref 21–32)
CREAT SERPL-MCNC: 1 MG/DL (ref 0.6–1.3)
GLUCOSE SERPL-MCNC: 103 MG/DL (ref 74–106)
HCT VFR BLD AUTO: 34.5 % (ref 36.7–47.1)
MAGNESIUM SERPL-MCNC: 1.8 MG/DL (ref 1.8–2.4)
MCH RBC QN AUTO: 29.6 UUG (ref 23.8–33.4)
MCV RBC AUTO: 88.7 FL (ref 73–96.2)
PHOSPHATE SERPL-MCNC: 1.8 MG/DL (ref 2.5–4.9)
PLATELET # BLD AUTO: 123 K/UL (ref 152–348)
POTASSIUM SERPL-SCNC: 4.5 MMOL/L (ref 3.5–5.1)

## 2022-05-22 RX ADMIN — MIDODRINE HYDROCHLORIDE SCH MG: 5 TABLET ORAL at 13:25

## 2022-05-22 RX ADMIN — Medication SCH ML: at 08:12

## 2022-05-22 RX ADMIN — Medication PRN MLS/HR: at 14:30

## 2022-05-22 RX ADMIN — DEXTROSE AND SODIUM CHLORIDE PRN MLS/HR: 5; .9 INJECTION, SOLUTION INTRAVENOUS at 18:01

## 2022-05-22 RX ADMIN — DEXTROSE AND SODIUM CHLORIDE PRN MLS/HR: 5; .9 INJECTION, SOLUTION INTRAVENOUS at 05:30

## 2022-05-22 RX ADMIN — PANTOPRAZOLE SODIUM SCH MG: 40 TABLET, DELAYED RELEASE ORAL at 06:06

## 2022-05-22 RX ADMIN — MIDODRINE HYDROCHLORIDE SCH MG: 5 TABLET ORAL at 05:04

## 2022-05-22 RX ADMIN — HYDROCODONE BITARTRATE AND ACETAMINOPHEN PRN TAB: 5; 325 TABLET ORAL at 13:49

## 2022-05-22 RX ADMIN — MIDODRINE HYDROCHLORIDE SCH MG: 5 TABLET ORAL at 21:07

## 2022-05-22 NOTE — NUR
patient in bed ,awake but confused . mittens on for safety ,patient removing heart monitor 
and pulse oximetry probe . 

needs reorientation.no s/s of respiratory distress breathing even and unlabored .

on dopamine drip for hr and bp will monitor and follow dopamine drip protocol see 
spreadsheet.

## 2022-05-22 NOTE — NUR
Patient seen by cardiologist Dr. Frank report given and as stated "family needs to make a 
decision about pace maker due to pt's continuous drop in heart rate". Orders to continue 
with care plan.

## 2022-05-22 NOTE — NUR
turned and reposition patient incontinent of urine changed soiled diaper and cleaned with 
soap and water pat dry,applied z Guard to bilateral groin sacral and scrotal area .

## 2022-05-22 NOTE — NUR
A call to Dr. Frank and a this time He was informed that pt. is not tolerating been off 
dopamine drip. Orders to restart dopamine and keep sbp above 90 received.

## 2022-05-22 NOTE — NUR
patient in bed asleep no s/s of distress ,v/s wnl . dopamine drip infusing  via the right 
upper arm piccline .

## 2022-05-22 NOTE — NUR
patient colostomy bag is full ,changed colostomy bag ,colostomy care done.

incontinent of urine ,cleaned with soap and water ,pat dry ,applied z guard to bilateral 
groin ,scrotal area and sacral area.bath patient changed soiled linens and gown . patient 
help in turning and repositioning .

## 2022-05-22 NOTE — NUR
changed colostomy bag patient had a large bm soft brownish un color . incontinent of urine 
cleaned with soap and water pat dry ,applied z guard and changed soiled linens and gown .

## 2022-05-22 NOTE — NUR
Received pt. in bed slightly restless agitated, on BUE mittens circulation present. 
Cardiac-wise pt. remains on dopamine drip with heart rate in the 60-8-70's while pt. 
restless and agitated. sbp in the low 80's to desire limits. IV line patent. LUE remains 
swollen elevated above heart level. Will continue to monitor.

## 2022-05-23 VITALS — SYSTOLIC BLOOD PRESSURE: 154 MMHG | DIASTOLIC BLOOD PRESSURE: 63 MMHG

## 2022-05-23 VITALS — SYSTOLIC BLOOD PRESSURE: 157 MMHG | DIASTOLIC BLOOD PRESSURE: 92 MMHG

## 2022-05-23 VITALS — SYSTOLIC BLOOD PRESSURE: 140 MMHG | DIASTOLIC BLOOD PRESSURE: 47 MMHG

## 2022-05-23 VITALS — DIASTOLIC BLOOD PRESSURE: 79 MMHG | SYSTOLIC BLOOD PRESSURE: 124 MMHG

## 2022-05-23 VITALS — DIASTOLIC BLOOD PRESSURE: 29 MMHG | SYSTOLIC BLOOD PRESSURE: 102 MMHG

## 2022-05-23 VITALS — SYSTOLIC BLOOD PRESSURE: 94 MMHG | DIASTOLIC BLOOD PRESSURE: 58 MMHG

## 2022-05-23 VITALS — DIASTOLIC BLOOD PRESSURE: 93 MMHG | SYSTOLIC BLOOD PRESSURE: 155 MMHG

## 2022-05-23 VITALS — SYSTOLIC BLOOD PRESSURE: 142 MMHG | DIASTOLIC BLOOD PRESSURE: 64 MMHG

## 2022-05-23 VITALS — DIASTOLIC BLOOD PRESSURE: 56 MMHG | SYSTOLIC BLOOD PRESSURE: 109 MMHG

## 2022-05-23 VITALS — DIASTOLIC BLOOD PRESSURE: 49 MMHG | SYSTOLIC BLOOD PRESSURE: 128 MMHG

## 2022-05-23 VITALS — DIASTOLIC BLOOD PRESSURE: 97 MMHG | SYSTOLIC BLOOD PRESSURE: 159 MMHG

## 2022-05-23 VITALS — SYSTOLIC BLOOD PRESSURE: 162 MMHG | DIASTOLIC BLOOD PRESSURE: 95 MMHG

## 2022-05-23 VITALS — DIASTOLIC BLOOD PRESSURE: 57 MMHG | SYSTOLIC BLOOD PRESSURE: 155 MMHG

## 2022-05-23 VITALS — DIASTOLIC BLOOD PRESSURE: 90 MMHG | SYSTOLIC BLOOD PRESSURE: 129 MMHG

## 2022-05-23 VITALS — DIASTOLIC BLOOD PRESSURE: 46 MMHG | SYSTOLIC BLOOD PRESSURE: 87 MMHG

## 2022-05-23 VITALS — DIASTOLIC BLOOD PRESSURE: 108 MMHG | SYSTOLIC BLOOD PRESSURE: 149 MMHG

## 2022-05-23 VITALS — DIASTOLIC BLOOD PRESSURE: 58 MMHG | SYSTOLIC BLOOD PRESSURE: 128 MMHG

## 2022-05-23 VITALS — SYSTOLIC BLOOD PRESSURE: 169 MMHG | DIASTOLIC BLOOD PRESSURE: 97 MMHG

## 2022-05-23 VITALS — DIASTOLIC BLOOD PRESSURE: 49 MMHG | SYSTOLIC BLOOD PRESSURE: 100 MMHG

## 2022-05-23 VITALS — SYSTOLIC BLOOD PRESSURE: 108 MMHG | DIASTOLIC BLOOD PRESSURE: 62 MMHG

## 2022-05-23 VITALS — DIASTOLIC BLOOD PRESSURE: 89 MMHG | SYSTOLIC BLOOD PRESSURE: 148 MMHG

## 2022-05-23 VITALS — SYSTOLIC BLOOD PRESSURE: 125 MMHG | DIASTOLIC BLOOD PRESSURE: 62 MMHG

## 2022-05-23 VITALS — DIASTOLIC BLOOD PRESSURE: 94 MMHG | SYSTOLIC BLOOD PRESSURE: 147 MMHG

## 2022-05-23 LAB
BUN SERPL-MCNC: 16 MG/DL (ref 7–18)
CHLORIDE SERPL-SCNC: 119 MMOL/L (ref 98–107)
CO2 SERPL-SCNC: 18 MMOL/L (ref 21–32)
CREAT SERPL-MCNC: 1 MG/DL (ref 0.6–1.3)
GLUCOSE SERPL-MCNC: 97 MG/DL (ref 74–106)
PHOSPHATE SERPL-MCNC: 2.2 MG/DL (ref 2.5–4.9)
POTASSIUM SERPL-SCNC: 4.7 MMOL/L (ref 3.5–5.1)

## 2022-05-23 RX ADMIN — DEXTROSE AND SODIUM CHLORIDE PRN MLS/HR: 5; .9 INJECTION, SOLUTION INTRAVENOUS at 08:25

## 2022-05-23 RX ADMIN — PANTOPRAZOLE SODIUM SCH MG: 40 TABLET, DELAYED RELEASE ORAL at 06:21

## 2022-05-23 RX ADMIN — Medication SCH ML: at 08:08

## 2022-05-23 RX ADMIN — MIDODRINE HYDROCHLORIDE SCH MG: 5 TABLET ORAL at 05:21

## 2022-05-23 RX ADMIN — MIDODRINE HYDROCHLORIDE SCH MG: 5 TABLET ORAL at 13:15

## 2022-05-23 NOTE — NUR
patient sleeping in bed ,able to turned and reposition self . no sob ,breathing even and 
unlabored .

## 2022-05-23 NOTE — NUR
EMT staff report given to Vadim. IV lines and IV medications dcd as ordered by MD. Pt's 
heart rate of 64, sbp of 110/62. RR 20. Patient left Awake and alert and confused. As stated 
by case management Mr. Navarro Pt's mom and daughter called and notified to expect pt,. at 
any time.

## 2022-05-23 NOTE — NUR
Patient seen by cardiologist Dr. Crowley, report given and at this time  updated pt's 
daughter of care plan.

## 2022-05-23 NOTE — NUR
Received pt. AAOx1 name only restless agitated. IV line off pt. removed it during break from 
BUE restrains. ON Controlled A-Fib rate in the 70's -80's, pt. remains on dopamine drip 
running at 2mcg/kg/min. sbp labile. On Room air with saturation above 95%. no tachypnea. 
Safety measures implemented will continue to monitor.